# Patient Record
Sex: MALE | Race: BLACK OR AFRICAN AMERICAN
[De-identification: names, ages, dates, MRNs, and addresses within clinical notes are randomized per-mention and may not be internally consistent; named-entity substitution may affect disease eponyms.]

---

## 2020-06-17 ENCOUNTER — HOSPITAL ENCOUNTER (EMERGENCY)
Dept: HOSPITAL 76 - ED | Age: 25
Discharge: HOME | End: 2020-06-17
Payer: COMMERCIAL

## 2020-06-17 VITALS — SYSTOLIC BLOOD PRESSURE: 117 MMHG | DIASTOLIC BLOOD PRESSURE: 66 MMHG

## 2020-06-17 DIAGNOSIS — R10.33: ICD-10-CM

## 2020-06-17 DIAGNOSIS — K59.00: Primary | ICD-10-CM

## 2020-06-17 LAB
ALBUMIN DIAFP-MCNC: 4.7 G/DL (ref 3.2–5.5)
ALBUMIN/GLOB SERPL: 2 {RATIO} (ref 1–2.2)
ALP SERPL-CCNC: 60 IU/L (ref 42–121)
ALT SERPL W P-5'-P-CCNC: 24 IU/L (ref 10–60)
ANION GAP SERPL CALCULATED.4IONS-SCNC: 11 MMOL/L (ref 6–13)
AST SERPL W P-5'-P-CCNC: 26 IU/L (ref 10–42)
BASOPHILS NFR BLD AUTO: 0 10^3/UL (ref 0–0.1)
BASOPHILS NFR BLD AUTO: 0.4 %
BILIRUB BLD-MCNC: 1.2 MG/DL (ref 0.2–1)
BUN SERPL-MCNC: 13 MG/DL (ref 6–20)
CALCIUM UR-MCNC: 9.5 MG/DL (ref 8.5–10.3)
CHLORIDE SERPL-SCNC: 101 MMOL/L (ref 101–111)
CO2 SERPL-SCNC: 28 MMOL/L (ref 21–32)
CREAT SERPLBLD-SCNC: 1.1 MG/DL (ref 0.6–1.2)
EOSINOPHIL # BLD AUTO: 0.1 10^3/UL (ref 0–0.7)
EOSINOPHIL NFR BLD AUTO: 1.7 %
ERYTHROCYTE [DISTWIDTH] IN BLOOD BY AUTOMATED COUNT: 13.5 % (ref 12–15)
GLOBULIN SER-MCNC: 2.3 G/DL (ref 2.1–4.2)
GLUCOSE SERPL-MCNC: 76 MG/DL (ref 70–100)
HGB UR QL STRIP: 14.8 G/DL (ref 14–18)
LIPASE SERPL-CCNC: 34 U/L (ref 22–51)
LYMPHOCYTES # SPEC AUTO: 1.6 10^3/UL (ref 1.5–3.5)
LYMPHOCYTES NFR BLD AUTO: 29.3 %
MCH RBC QN AUTO: 28.7 PG (ref 27–31)
MCHC RBC AUTO-ENTMCNC: 32.7 G/DL (ref 32–36)
MCV RBC AUTO: 87.8 FL (ref 80–94)
MONOCYTES # BLD AUTO: 0.4 10^3/UL (ref 0–1)
MONOCYTES NFR BLD AUTO: 7.2 %
NEUTROPHILS # BLD AUTO: 3.3 10^3/UL (ref 1.5–6.6)
NEUTROPHILS # SNV AUTO: 5.4 X10^3/UL (ref 4.8–10.8)
NEUTROPHILS NFR BLD AUTO: 61.2 %
PDW BLD AUTO: 9.9 FL (ref 7.4–11.4)
PLATELET # BLD: 201 10^3/UL (ref 130–450)
PROT SPEC-MCNC: 7 G/DL (ref 6.7–8.2)
RBC MAR: 5.15 10^6/UL (ref 4.7–6.1)
SODIUM SERPLBLD-SCNC: 140 MMOL/L (ref 135–145)

## 2020-06-17 PROCEDURE — 74177 CT ABD & PELVIS W/CONTRAST: CPT

## 2020-06-17 PROCEDURE — 99284 EMERGENCY DEPT VISIT MOD MDM: CPT

## 2020-06-17 PROCEDURE — 83690 ASSAY OF LIPASE: CPT

## 2020-06-17 PROCEDURE — 36415 COLL VENOUS BLD VENIPUNCTURE: CPT

## 2020-06-17 PROCEDURE — 85025 COMPLETE CBC W/AUTO DIFF WBC: CPT

## 2020-06-17 PROCEDURE — 80053 COMPREHEN METABOLIC PANEL: CPT

## 2020-06-17 NOTE — ED PHYSICIAN DOCUMENTATION
PD HPI ABD PAIN





- Stated complaint


Stated Complaint: LOW ABD PAIN





- Chief complaint


Chief Complaint: Abd Pain





- History obtained from


History obtained from: Patient, Family





- History of Present Illness


Timing - onset: Other (Very healthy 24-year-old gentleman with no history of 

abdominal surgeries no major medical problems presents with sudden onset right 

periumbilical pain starting 30 minutes prior to arrival which is severe but 

declines anything stronger than ibuprofen for the pain.  There is no nausea.  It

is a spasm)





Review of Systems


Ten Systems: 10 systems reviewed and negative


Constitutional: denies: Fever, Chills


Cardiac: denies: Chest pain / pressure, Palpitations


Respiratory: denies: Dyspnea, Cough





PD PAST MEDICAL HISTORY





- Past Medical History


Past Medical History: Yes


Cardiovascular: None


Respiratory: Asthma


Endocrine/Autoimmune: None


GI: None


: None


HEENT: None


Psych: None


Derm: None





- Present Medications


Home Medications: 


                                Ambulatory Orders











 Medication  Instructions  Recorded  Confirmed


 


Ipratropium/Albuterol Inhaler 1 puffs INH RTQ4H PRN 06/10/13 06/10/13





[Combivent Inhaler]   


 


Minocycline [Minocycline HCl] 100 mg PO DAILY 06/10/13 06/10/13














- Allergies


Allergies/Adverse Reactions: 


                                    Allergies











Allergy/AdvReac Type Severity Reaction Status Date / Time


 


No Known Drug Allergies Allergy   Verified 06/10/13 10:35














PD ED PE NORMAL





- Vitals


Vital signs reviewed: Yes





- General


General: Alert and oriented X 3, Other (Appears slightly uncomfortable)





- HEENT


HEENT: PERRL, EOMI





- Neck


Neck: Supple, no meningeal sign, No bony TTP





- Cardiac


Cardiac: RRR, No murmur





- Respiratory


Respiratory: No respiratory distress, Clear bilaterally





- Abdomen


Abdomen: Other (Mild right-sided abdominal tenderness, basically from the 

periumbilical area down.  No surgical signs.)





- Back


Back: No CVA TTP, No spinal TTP





- Derm


Derm: Normal color, Warm and dry





- Extremities


Extremities: No edema, No calf tenderness / cord





- Neuro


Neuro: Alert and oriented X 3, Normal speech





Results





- Vitals


Vitals: 


                               Vital Signs - 24 hr











  06/17/20 06/17/20 06/17/20





  19:29 20:17 20:30


 


Temperature 36.9 C  


 


Heart Rate 80  


 


Respiratory 18 16 16





Rate   


 


Blood Pressure 133/66 H  


 


O2 Saturation 100  














  06/17/20 06/17/20





  20:31 21:16


 


Temperature  


 


Heart Rate 65 77


 


Respiratory 16 17





Rate  


 


Blood Pressure 140/75 H 


 


O2 Saturation 100 100








                                     Oxygen











O2 Source                      Room air

















- Labs


Labs: 


                                Laboratory Tests











  06/17/20 06/17/20





  19:44 19:44


 


WBC  5.4 


 


RBC  5.15 


 


Hgb  14.8 


 


Hct  45.2 


 


MCV  87.8 


 


MCH  28.7 


 


MCHC  32.7 


 


RDW  13.5 


 


Plt Count  201 


 


MPV  9.9 


 


Neut # (Auto)  3.3 


 


Lymph # (Auto)  1.6 


 


Mono # (Auto)  0.4 


 


Eos # (Auto)  0.1 


 


Baso # (Auto)  0.0 


 


Absolute Nucleated RBC  0.00 


 


Nucleated RBC %  0.0 


 


Sodium   140


 


Potassium   3.5


 


Chloride   101


 


Carbon Dioxide   28


 


Anion Gap   11.0


 


BUN   13


 


Creatinine   1.1


 


Estimated GFR (MDRD)   100


 


Glucose   76


 


Calcium   9.5


 


Total Bilirubin   1.2 H


 


AST   26


 


ALT   24


 


Alkaline Phosphatase   60


 


Total Protein   7.0


 


Albumin   4.7


 


Globulin   2.3


 


Albumin/Globulin Ratio   2.0


 


Lipase   34














- Rads (name of study)


  ** CT A/P


Radiology: EMP read contemporaneously (Generous stool load, otherwise no acute 

disease)





PD MEDICAL DECISION MAKING





- ED course


ED course: 





24-year-old gentleman presents with right-sided abdominal pain, the onset and 

location would be atypical for surgical emergency such as appendicitis.  His 

work-up was negative except for large stool load on CT and we will trial 

magnesium citrate.





Departure





- Departure


Disposition: 01 Home, Self Care


Clinical Impression: 


Constipation


Qualifiers:


 Constipation type: unspecified constipation type Qualified Code(s): K59.00 - 

Constipation, unspecified





Abdominal pain


Qualifiers:


 Abdominal location: unspecified location Qualified Code(s): R10.9 - Unspecified

abdominal pain





Condition: Good


Record reviewed to determine appropriate education?: Yes


Instructions:  ED Constipation, Abdominal Pain


Comments: 


Return if worse, especially if you run a fever.  Follow-up with your primary 

care physician, discuss upper endoscopy.

## 2020-06-17 NOTE — CT REPORT
PROCEDURE:  Abdomen/Pelvis W

 

INDICATIONS:  abd pain

 

CONTRAST:  IV CONTRAST: Optiray 320 ml: 100 PO CONTRAST: *NO PO CONTRAST 

 

TECHNIQUE:  

After the administration of oral and intravenous contrast, 5 mm thick sections acquired from the diap
hragms to the symphysis.  5 mm thick coronal and sagittal reformats were acquired.  For radiation dos
e reduction, the following was used:  automated exposure control, adjustment of mA and/or kV accordin
g to patient size.  

 

COMPARISON:  None.

 

FINDINGS:  

Image quality:  Excellent.  

 

ABDOMEN:  

Lung bases:  Lung bases are clear.  Heart size is normal.  

 

Solid organs:  Liver and spleen are normal in size and enhancement.  Gallbladder is normal  Biliary s
ystem is non dilated.  Pancreas enhances normally.  No adrenal nodules.  Kidneys demonstrate normal s
ize and enhancement, without hydronephrosis.  

 

Peritoneum and bowel:  Bowel loops demonstrate normal wall thickness and caliber. Moderate amount of 
stool noted throughout the colon. No free fluid or air. Visualized portions of the appendix are sandrine
l. 

 

Nodes and vessels:  No retroperitoneal or mesenteric adenopathy by size criteria.  Aorta and inferior
 vena cava are normal in size.  

 

Miscellaneous:  No ventral hernias.  

 

 

PELVIS:  

Genitourinary:  Bladder wall thickness is normal.  

 

Miscellaneous:  No inguinal hernias or adenopathy.  

 

Bones:  No suspicious bony lesions.  No vertebral body compression fractures.  

 

 

IMPRESSION:  

 

1. No acute disease process identified.

 

2. No free fluid or free air.

 

3. No dilated loops of bowel.

 

4. Visualized appendix is normal.

 

5. Moderate fecal loading throughout the colon. Please correlate with clinical data.

 

Reviewed by: Bibiana Conner MD, PhD on 6/17/2020 8:49 PM PDT

Approved by: Bibiana Conner MD, PhD on 6/17/2020 8:49 PM PDT

 

 

Station ID:  [id number]